# Patient Record
Sex: FEMALE | Race: WHITE | Employment: UNEMPLOYED | URBAN - METROPOLITAN AREA
[De-identification: names, ages, dates, MRNs, and addresses within clinical notes are randomized per-mention and may not be internally consistent; named-entity substitution may affect disease eponyms.]

---

## 2020-07-15 PROBLEM — M48.062 LUMBAR STENOSIS WITH NEUROGENIC CLAUDICATION: Status: ACTIVE | Noted: 2020-07-15

## 2022-10-11 PROBLEM — S32.009K LUMBAR PSEUDOARTHROSIS: Status: ACTIVE | Noted: 2022-10-11

## 2023-08-25 LAB
6-ACETYLMORPHINE: <25 NG/ML
7-AMINOCLONAZEPAM: <25 NG/ML
ALPHA-HYDROXYALPRAZOLAM: <25 NG/ML
ALPHA-HYDROXYMIDAZOLAM: <25 NG/ML
ALPRAZOLAM: <25 NG/ML
AMPHETAMINE (PRESENCE) IN URINE BY SCREEN METHOD: ABNORMAL
BARBITURATES PRESENCE IN URINE BY SCREEN METHOD: ABNORMAL
CANNABINOIDS IN URINE BY SCREEN METHOD: ABNORMAL
CHLORDIAZEPOXIDE: <25 NG/ML
CLONAZEPAM: <25 NG/ML
COCAINE (PRESENCE) IN URINE BY SCREEN METHOD: ABNORMAL
CODEINE: <50 NG/ML
CREATINE, URINE FOR DRUG: 11.8 MG/DL
DIAZEPAM: <25 NG/ML
DRUG SCREEN COMMENT URINE: ABNORMAL
EDDP: <25 NG/ML
FENTANYL CONFIRMATION, URINE: <2.5 NG/ML
HYDROCODONE: <25 NG/ML
HYDROMORPHONE: <25 NG/ML
LORAZEPAM: <25 NG/ML
METHADONE CONFIRMATION,URINE: <25 NG/ML
MIDAZOLAM: <25 NG/ML
MORPHINE URINE: <50 NG/ML
NORDIAZEPAM: <25 NG/ML
NORFENTANYL: <2.5 NG/ML
NORHYDROCODONE: <25 NG/ML
NOROXYCODONE: 657 NG/ML
O-DESMETHYLTRAMADOL: <50 NG/ML
OXAZEPAM: 70 NG/ML
OXYCODONE: 748 NG/ML
OXYMORPHONE: 508 NG/ML
PHENCYCLIDINE (PRESENCE) IN URINE BY SCREEN METHOD: ABNORMAL
SPECIFIC GRAVITY, URINE DRUG: 1
TEMAZEPAM: 34 NG/ML
TRAMADOL: <50 NG/ML
ZOLPIDEM METABOLITE (ZCA): 334 NG/ML
ZOLPIDEM: <25 NG/ML

## 2023-10-09 ENCOUNTER — TELEPHONE (OUTPATIENT)
Dept: PRIMARY CARE | Facility: CLINIC | Age: 63
End: 2023-10-09
Payer: COMMERCIAL

## 2023-10-09 DIAGNOSIS — M79.7 FIBROMYALGIA: Primary | ICD-10-CM

## 2023-10-09 PROBLEM — E78.2 MIXED HYPERLIPIDEMIA: Status: ACTIVE | Noted: 2023-10-09

## 2023-10-09 PROBLEM — M86.8X7 OSTEOMYELITIS OF FOREFOOT (MULTI): Status: ACTIVE | Noted: 2023-10-09

## 2023-10-09 PROBLEM — F41.8 SITUATIONAL ANXIETY: Status: ACTIVE | Noted: 2023-10-09

## 2023-10-09 PROBLEM — L40.0 PLAQUE PSORIASIS: Status: ACTIVE | Noted: 2023-10-09

## 2023-10-09 PROBLEM — J68.3 REACTIVE AIRWAYS DYSFUNCTION SYNDROME (MULTI): Status: ACTIVE | Noted: 2023-10-09

## 2023-10-09 PROBLEM — M19.041 LOCALIZED OSTEOARTHRITIS OF BOTH HANDS: Status: ACTIVE | Noted: 2023-10-09

## 2023-10-09 PROBLEM — M54.16 LUMBAR RADICULOPATHY: Status: ACTIVE | Noted: 2023-10-09

## 2023-10-09 PROBLEM — M19.042 LOCALIZED OSTEOARTHRITIS OF BOTH HANDS: Status: ACTIVE | Noted: 2023-10-09

## 2023-10-09 PROBLEM — M17.0 PRIMARY OSTEOARTHRITIS OF BOTH KNEES: Status: ACTIVE | Noted: 2023-10-09

## 2023-10-09 PROBLEM — K22.70 BARRETT'S ESOPHAGUS: Status: ACTIVE | Noted: 2023-10-09

## 2023-10-09 PROBLEM — I10 HYPERTENSION: Status: ACTIVE | Noted: 2023-10-09

## 2023-10-09 PROBLEM — M15.9 GENERALIZED OSTEOARTHRITIS: Status: ACTIVE | Noted: 2023-10-09

## 2023-10-09 PROBLEM — Z79.61 LONG-TERM CURRENT USE OF APREMILAST: Status: ACTIVE | Noted: 2023-10-09

## 2023-10-09 RX ORDER — LIFITEGRAST 50 MG/ML
SOLUTION/ DROPS OPHTHALMIC
COMMUNITY
Start: 2021-06-08 | End: 2023-11-27 | Stop reason: ALTCHOICE

## 2023-10-09 RX ORDER — PREGABALIN 150 MG/1
150 CAPSULE ORAL 2 TIMES DAILY
Qty: 60 CAPSULE | Refills: 0 | Status: CANCELLED | OUTPATIENT
Start: 2023-10-09

## 2023-10-09 RX ORDER — CELECOXIB 200 MG/1
200 CAPSULE ORAL DAILY
COMMUNITY
End: 2024-04-10 | Stop reason: SDUPTHER

## 2023-10-09 RX ORDER — APREMILAST 30 MG/1
1 TABLET, FILM COATED ORAL 2 TIMES DAILY
COMMUNITY
Start: 2016-10-05 | End: 2023-11-27 | Stop reason: ALTCHOICE

## 2023-10-09 RX ORDER — FLUTICASONE PROPIONATE 50 MCG
SPRAY, SUSPENSION (ML) NASAL
COMMUNITY
Start: 2023-05-18 | End: 2023-11-27 | Stop reason: ALTCHOICE

## 2023-10-09 RX ORDER — SUCRALFATE 1 G/1
TABLET ORAL
COMMUNITY
Start: 2021-02-15 | End: 2023-11-27 | Stop reason: ALTCHOICE

## 2023-10-09 RX ORDER — MONTELUKAST SODIUM 10 MG/1
10 TABLET ORAL NIGHTLY
COMMUNITY

## 2023-10-09 RX ORDER — PREGABALIN 150 MG/1
150 CAPSULE ORAL 2 TIMES DAILY
COMMUNITY
End: 2023-10-09 | Stop reason: SDUPTHER

## 2023-10-09 RX ORDER — IPRATROPIUM BROMIDE AND ALBUTEROL SULFATE 2.5; .5 MG/3ML; MG/3ML
SOLUTION RESPIRATORY (INHALATION)
COMMUNITY
Start: 2023-06-26

## 2023-10-09 RX ORDER — NALOXONE HYDROCHLORIDE 4 MG/.1ML
SPRAY NASAL
COMMUNITY
Start: 2019-07-29

## 2023-10-09 RX ORDER — ROSUVASTATIN CALCIUM 10 MG/1
10 TABLET, COATED ORAL DAILY
COMMUNITY
End: 2024-05-24 | Stop reason: WASHOUT

## 2023-10-09 RX ORDER — ALBUTEROL SULFATE 90 UG/1
AEROSOL, METERED RESPIRATORY (INHALATION)
COMMUNITY
Start: 2018-04-18

## 2023-10-09 RX ORDER — METOPROLOL SUCCINATE 50 MG/1
100 TABLET, EXTENDED RELEASE ORAL DAILY
COMMUNITY

## 2023-10-09 RX ORDER — PREGABALIN 150 MG/1
150 CAPSULE ORAL 2 TIMES DAILY
Qty: 60 CAPSULE | Refills: 0 | Status: SHIPPED | OUTPATIENT
Start: 2023-10-09 | End: 2023-11-13 | Stop reason: SDUPTHER

## 2023-10-09 RX ORDER — FUROSEMIDE 40 MG/1
1 TABLET ORAL DAILY PRN
COMMUNITY
Start: 2012-07-18

## 2023-10-09 RX ORDER — FLUTICASONE FUROATE AND VILANTEROL TRIFENATATE 200; 25 UG/1; UG/1
1 POWDER RESPIRATORY (INHALATION) DAILY
COMMUNITY

## 2023-10-09 RX ORDER — PREDNISONE 10 MG/1
10 TABLET ORAL DAILY
COMMUNITY

## 2023-10-09 RX ORDER — CARISOPRODOL 350 MG/1
TABLET ORAL
COMMUNITY
Start: 2022-10-17 | End: 2023-11-27 | Stop reason: ALTCHOICE

## 2023-10-09 RX ORDER — CHOLECALCIFEROL (VITAMIN D3) 25 MCG
1 TABLET ORAL DAILY
COMMUNITY
Start: 2017-06-29

## 2023-10-09 RX ORDER — CYCLOBENZAPRINE HCL 10 MG
10 TABLET ORAL 3 TIMES DAILY
COMMUNITY
Start: 2023-03-22 | End: 2024-05-24 | Stop reason: WASHOUT

## 2023-10-09 RX ORDER — OXYCODONE AND ACETAMINOPHEN 10; 325 MG/1; MG/1
TABLET ORAL
COMMUNITY
End: 2023-10-16 | Stop reason: SDUPTHER

## 2023-10-09 RX ORDER — ZOLPIDEM TARTRATE 10 MG/1
10 TABLET ORAL NIGHTLY
COMMUNITY

## 2023-10-09 RX ORDER — ALENDRONATE SODIUM 70 MG/1
TABLET ORAL
COMMUNITY
Start: 2023-04-13 | End: 2023-11-27 | Stop reason: ALTCHOICE

## 2023-10-09 RX ORDER — ESOMEPRAZOLE MAGNESIUM 40 MG/1
40 CAPSULE, DELAYED RELEASE ORAL DAILY
COMMUNITY

## 2023-10-09 RX ORDER — VENLAFAXINE HYDROCHLORIDE 75 MG/1
1 TABLET, EXTENDED RELEASE ORAL 2 TIMES DAILY
COMMUNITY
End: 2023-11-27 | Stop reason: ALTCHOICE

## 2023-10-09 RX ORDER — DIAZEPAM 5 MG/1
TABLET ORAL
COMMUNITY

## 2023-10-09 NOTE — TELEPHONE ENCOUNTER
Wanted to let you know that she needs another toe amuptated. For the back she has to go back on the Soma 350mg. Also needs a refill on Lyrica refilled. Miguel.

## 2023-10-09 NOTE — PROGRESS NOTES
Subjective   Patient ID: Pushpa Garrett is a 63 y.o. female who presents for No chief complaint on file..  HPI    Review of Systems    Objective   Physical Exam    Assessment/Plan

## 2023-10-16 ENCOUNTER — TELEPHONE (OUTPATIENT)
Dept: RHEUMATOLOGY | Facility: CLINIC | Age: 63
End: 2023-10-16
Payer: COMMERCIAL

## 2023-10-16 RX ORDER — OXYCODONE AND ACETAMINOPHEN 10; 325 MG/1; MG/1
1-2 TABLET ORAL EVERY 6 HOURS PRN
Qty: 150 TABLET | Refills: 0 | Status: SHIPPED | OUTPATIENT
Start: 2023-10-17 | End: 2023-11-13 | Stop reason: SDUPTHER

## 2023-10-16 NOTE — TELEPHONE ENCOUNTER
PT CALLED FOR REFILL:  Oxycodone  mg 1 every 4-6 hours as needed  Jose A        *getting amputation today - reason for asking day early.

## 2023-11-13 ENCOUNTER — TELEPHONE (OUTPATIENT)
Dept: RHEUMATOLOGY | Facility: CLINIC | Age: 63
End: 2023-11-13
Payer: COMMERCIAL

## 2023-11-13 DIAGNOSIS — M79.7 FIBROMYALGIA: ICD-10-CM

## 2023-11-13 RX ORDER — PREGABALIN 150 MG/1
150 CAPSULE ORAL 2 TIMES DAILY
Qty: 60 CAPSULE | Refills: 0 | Status: SHIPPED | OUTPATIENT
Start: 2023-11-15 | End: 2023-12-13 | Stop reason: SDUPTHER

## 2023-11-13 RX ORDER — OXYCODONE AND ACETAMINOPHEN 10; 325 MG/1; MG/1
1-2 TABLET ORAL EVERY 6 HOURS PRN
Qty: 150 TABLET | Refills: 0 | Status: SHIPPED | OUTPATIENT
Start: 2023-11-16 | End: 2023-12-13 | Stop reason: SDUPTHER

## 2023-11-13 NOTE — TELEPHONE ENCOUNTER
Patient called requesting rx refills-  Oxycodone  mg  Lyrica 150 mg bid    [facility] [phone]  Patient phone [phone]

## 2023-11-24 PROBLEM — Z79.899 MEDICATION MANAGEMENT: Status: ACTIVE | Noted: 2023-11-24

## 2023-11-24 PROBLEM — I70.0 CALCIFICATION OF AORTA (CMS-HCC): Status: ACTIVE | Noted: 2023-11-24

## 2023-11-24 NOTE — PROGRESS NOTES
Subjective   Patient ID: Pushpa Garrett is a 63 y.o. female who presents for Fibromyalgia.  Pt reports arthritis has been slowly worsening.  Has trouble doing anything around her home.   Pt is thinking of entering into hospice to see if that will help her with comfort and quality of life  Struggling to do basic things.  Knows she needs back surgery but doesn't have the will to pursue it   She is getting counseling to help her deal with her chronic issues    Arthritis  Presents for follow-up visit. She complains of pain and stiffness. She reports no joint swelling. The symptoms have been worsening. Pertinent negatives include no fever or rash. Compliance with medications is %.     OARRS:    I have personally reviewed the OARRS report for Pushpa Garrett. I have considered the risks of abuse, dependence, addiction and diversion and I believe that it is clinically appropriate for Pushpa Garrett to be prescribed this medication    Is the patient prescribed a combination of a benzodiazepine and opioid?  No    Last Urine Drug Screen / ordered today: Yes  Recent Results (from the past 8760 hour(s))   OPIATE/OPIOID/BENZO PRESCRIPTION COMPLIANCE    Collection Time: 08/21/23  9:56 AM   Result Value Ref Range    DRUG SCREEN COMMENT URINE SEE BELOW     Creatine, Urine 11.8 (A) mg/dL    Specific Gravity, Urine Drug 1.0040 Valid 1.0020-1.0200    Amphetamine Screen, Urine PRESUMPTIVE NEGATIVE NEGATIVE    Barbiturate Screen, Urine PRESUMPTIVE NEGATIVE NEGATIVE    Cannabinoid Screen, Urine PRESUMPTIVE NEGATIVE NEGATIVE    Cocaine Screen, Urine PRESUMPTIVE NEGATIVE NEGATIVE    PCP Screen, Urine PRESUMPTIVE NEGATIVE NEGATIVE    7-Aminoclonazepam <25 Cutoff <25 ng/mL    Alpha-Hydroxyalprazolam <25 Cutoff <25 ng/mL    Alpha-Hydroxymidazolam <25 Cutoff <25 ng/mL    Alprazolam <25 Cutoff <25 ng/mL    Chlordiazepoxide <25 Cutoff <25 ng/mL    Clonazepam <25 Cutoff <25 ng/mL    Diazepam <25 Cutoff <25 ng/mL    Lorazepam <25 Cutoff  <25 ng/mL    Midazolam <25 Cutoff <25 ng/mL    Nordiazepam <25 Cutoff <25 ng/mL    Oxazepam 70 (A) Cutoff <25 ng/mL    Temazepam 34 (A) Cutoff <25 ng/mL    Zolpidem <25 Cutoff <25 ng/mL    Zolpidem Metabolite (ZCA) 334 (A) Cutoff <25 ng/mL    6-Acetylmorphine <25 Cutoff <25 ng/mL    Codeine <50 Cutoff <50 ng/mL    Hydrocodone <25 Cutoff <25 ng/mL    Hydromorphone <25 Cutoff <25 ng/mL    Morphine Urine <50 Cutoff <50 ng/mL    Norhydrocodone <25 Cutoff <25 ng/mL    Noroxycodone 657 (A) Cutoff <25 ng/mL    Oxycodone 748 (A) Cutoff <25 ng/mL    Oxymorphone 508 (A) Cutoff <25 ng/mL    Tramadol <50 Cutoff <50 ng/mL    O-Desmethyltramadol <50 Cutoff <50 ng/mL    Fentanyl <2.5 Cutoff<2.5 ng/mL    Norfentanyl <2.5 Cutoff<2.5 ng/mL    METHADONE CONFIRMATION,URINE <25 Cutoff <25 ng/mL    EDDP <25 Cutoff <25 ng/mL     Results are as expected.         Controlled Substance Agreement:  Date of the Last Agreement: 11/27/23  Reviewed Controlled Substance Agreement including but not limited to the benefits, risks, and alternatives to treatment with a Controlled Substance medication(s).    Opioids:  What is the patient's goal of therapy? Pain relief  Is this being achieved with current treatment? yes    I have calculated the patient's Morphine Dose Equivalent (MED):   I have considered referral to Pain Management and/or a specialist, and do not feel it is necessary at this time.    I feel that it is clinically indicated to continue this current medication regimen after consideration of alternative therapies, and other non-opioid treatment.    Opioid Risk Screening:  depression    Pain Assessment:  Controlled substance questionnaire    On scale of 0-10  -pain on average over the last week? 9  -pain at its worst over the last week? 10   %pain relieved by meds? 80  Amount of pain relief with meds make a difference? y  MD agrees with efficacy of med? y    Better/same/worse  Physical functioning same  Family relationships same  Social  relationships same  Mood same  Sleep pattern same  Overall functioning same  Side effects? no  Patient Active Problem List    Diagnosis Date Noted    Type 2 diabetes mellitus without complication, without long-term current use of insulin (CMS/HCC) 11/27/2023    Chronic obstructive pulmonary disease, unspecified COPD type (CMS/HCC) 11/27/2023    Immunosuppression (CMS/HCC) 11/27/2023    Calcification of aorta (CMS/HCC) 11/24/2023    Medication management 11/24/2023    Aldana's esophagus 10/09/2023    Fibromyalgia 10/09/2023    Generalized osteoarthritis 10/09/2023    Hypertension 10/09/2023    Localized osteoarthritis of both hands 10/09/2023    Long-term current use of apremilast 10/09/2023    Lumbar radiculopathy 10/09/2023    Mixed hyperlipidemia 10/09/2023    Osteomyelitis of forefoot (CMS/HCC) 10/09/2023    Plaque psoriasis 10/09/2023    Primary osteoarthritis of both knees 10/09/2023    Reactive airways dysfunction syndrome (CMS/HCC) 10/09/2023    Situational anxiety 10/09/2023     Current Outpatient Medications   Medication Instructions    albuterol (ProAir HFA) 90 mcg/actuation inhaler inhalation    Breo Ellipta 200-25 mcg/dose inhaler 1 puff, inhalation, Daily    celecoxib (CELEBREX) 200 mg, oral, Daily    cholecalciferol (Vitamin D-3) 25 MCG (1000 UT) tablet 1 tablet, oral, Daily    cyclobenzaprine (FLEXERIL) 10 mg, oral, 3 times daily    diazePAM (Valium) 5 mg tablet TAKE 1 TABLET BY MOUTH EVERY 24 HOURS AS NEEDED    esomeprazole (NEXIUM) 40 mg, oral, Daily    furosemide (Lasix) 40 mg tablet 1 tablet, oral, Daily PRN    ipratropium-albuteroL (Duo-Neb) 0.5-2.5 mg/3 mL nebulizer solution USE 3 MLS VIA NEBULIZER EVERY 4 HOURS AS NEEDED    metoprolol succinate XL (TOPROL-XL) 50 mg, oral, Daily    montelukast (SINGULAIR) 10 mg, oral, Nightly    naloxone (Narcan) 4 mg/0.1 mL nasal spray nasal    nystatin (Mycostatin) 100,000 unit/mL suspension oral, 2 times daily    oxyCODONE-acetaminophen (Percocet)  mg  "tablet 1-2 tablets, oral, Every 6 hours PRN    predniSONE (DELTASONE) 10 mg, oral, Daily, as directed    pregabalin (LYRICA) 150 mg, oral, 2 times daily    promethazine (PHENERGAN) 25 mg, oral, 3 times daily before meals    rosuvastatin (CRESTOR) 10 mg, oral, Daily    tiZANidine (ZANAFLEX) 4 mg, oral, Every 8 hours PRN    zolpidem (AMBIEN) 10 mg, oral, Nightly     Allergies   Allergen Reactions    Adhesive Tape-Silicones Hives    Alendronate Other     Muscle/joint pain     Muscle/joint pain    Citalopram Other     throat closing      Morphine Other    Penicillins Other    Sulfamethoxazole-Trimethoprim Other    Adhesive Rash    Levofloxacin Other, GI Upset and Hives     Other reaction(s): GI Upset   Other reaction(s): GI Upset, HIVES   \"throat closed\"    Other reaction(s): GI Upset, HIVES    \"throat closed\"    Sulfa (Sulfonamide Antibiotics) Rash       Review of Systems   Constitutional:  Negative for fever and unexpected weight change.   HENT:  Negative for congestion.    Respiratory:  Negative for cough and shortness of breath.    Cardiovascular:  Negative for leg swelling.   Musculoskeletal:  Positive for arthralgias, arthritis, back pain, gait problem, myalgias and stiffness. Negative for joint swelling.   Skin:  Negative for color change and rash.   Neurological:  Positive for weakness. Negative for dizziness, numbness and headaches.   Hematological:  Does not bruise/bleed easily.   Psychiatric/Behavioral:  Positive for dysphoric mood.        Objective  BP (!) 172/94   Pulse 91   Temp 36.2 °C (97.1 °F)   Ht 1.702 m (5' 7\")   Wt 82.8 kg (182 lb 8 oz)   BMI 28.58 kg/m²     Physical Exam  Vitals reviewed.   Constitutional:       General: She is not in acute distress.     Appearance: Normal appearance.   HENT:      Head: Normocephalic and atraumatic.   Eyes:      Conjunctiva/sclera: Conjunctivae normal.   Pulmonary:      Effort: Pulmonary effort is normal. No respiratory distress.   Musculoskeletal:         " General: No swelling, tenderness or deformity.      Right lower leg: No edema.      Left lower leg: No edema.      Comments: +tender points.     Using walker to ambulate   Skin:     Findings: No erythema or rash.   Neurological:      General: No focal deficit present.      Mental Status: She is alert and oriented to person, place, and time. Mental status is at baseline.      Gait: Gait abnormal.   Psychiatric:         Mood and Affect: Mood normal.         Assessment/Plan   Problem List Items Addressed This Visit             ICD-10-CM    Fibromyalgia M79.7    Generalized osteoarthritis M15.9     OA and FMS on chronic opioids.  Still struggling to do things.   Pt hopes hospice will provide some stability for her         Reactive airways dysfunction syndrome (CMS/[facility]) J68.3     Stable  Managed by PCP         Medication management Z79.899     On chronic opioid.   CSA done 11/27/23         Type 2 diabetes mellitus without complication, without long-term current use of insulin (CMS/[facility]) - Primary E11.9     Stable managed by PCP         Chronic obstructive pulmonary disease, unspecified COPD type (CMS/[facility]) J44.9     Stable   Managed by PCP         Immunosuppression (CMS/[facility]) D84.9     On chronic steroids

## 2023-11-27 ENCOUNTER — OFFICE VISIT (OUTPATIENT)
Dept: RHEUMATOLOGY | Facility: CLINIC | Age: 63
End: 2023-11-27
Payer: COMMERCIAL

## 2023-11-27 VITALS
DIASTOLIC BLOOD PRESSURE: 94 MMHG | TEMPERATURE: 97.1 F | SYSTOLIC BLOOD PRESSURE: 172 MMHG | WEIGHT: 182.5 LBS | BODY MASS INDEX: 28.64 KG/M2 | HEIGHT: 67 IN | HEART RATE: 91 BPM

## 2023-11-27 DIAGNOSIS — J44.9 CHRONIC OBSTRUCTIVE PULMONARY DISEASE, UNSPECIFIED COPD TYPE (MULTI): ICD-10-CM

## 2023-11-27 DIAGNOSIS — E11.9 TYPE 2 DIABETES MELLITUS WITHOUT COMPLICATION, WITHOUT LONG-TERM CURRENT USE OF INSULIN (MULTI): ICD-10-CM

## 2023-11-27 DIAGNOSIS — J68.3 REACTIVE AIRWAYS DYSFUNCTION SYNDROME (MULTI): ICD-10-CM

## 2023-11-27 DIAGNOSIS — D84.9 IMMUNOSUPPRESSION (MULTI): ICD-10-CM

## 2023-11-27 DIAGNOSIS — Z79.899 MEDICATION MANAGEMENT: ICD-10-CM

## 2023-11-27 DIAGNOSIS — M79.7 FIBROMYALGIA: ICD-10-CM

## 2023-11-27 DIAGNOSIS — M15.9 GENERALIZED OSTEOARTHRITIS: Primary | ICD-10-CM

## 2023-11-27 PROCEDURE — 1160F RVW MEDS BY RX/DR IN RCRD: CPT | Performed by: INTERNAL MEDICINE

## 2023-11-27 PROCEDURE — 3077F SYST BP >= 140 MM HG: CPT | Performed by: INTERNAL MEDICINE

## 2023-11-27 PROCEDURE — 3080F DIAST BP >= 90 MM HG: CPT | Performed by: INTERNAL MEDICINE

## 2023-11-27 PROCEDURE — 99214 OFFICE O/P EST MOD 30 MIN: CPT | Performed by: INTERNAL MEDICINE

## 2023-11-27 PROCEDURE — 1125F AMNT PAIN NOTED PAIN PRSNT: CPT | Performed by: INTERNAL MEDICINE

## 2023-11-27 PROCEDURE — 1159F MED LIST DOCD IN RCRD: CPT | Performed by: INTERNAL MEDICINE

## 2023-11-27 PROCEDURE — 4004F PT TOBACCO SCREEN RCVD TLK: CPT | Performed by: INTERNAL MEDICINE

## 2023-11-27 RX ORDER — NYSTATIN 100000 [USP'U]/ML
SUSPENSION ORAL 2 TIMES DAILY
COMMUNITY
Start: 2023-11-24 | End: 2024-05-24 | Stop reason: WASHOUT

## 2023-11-27 RX ORDER — PROMETHAZINE HYDROCHLORIDE 25 MG/1
25 TABLET ORAL
COMMUNITY
Start: 2023-11-16

## 2023-11-27 RX ORDER — TIZANIDINE 4 MG/1
4 TABLET ORAL EVERY 8 HOURS PRN
COMMUNITY
Start: 2023-05-05 | End: 2024-02-19 | Stop reason: WASHOUT

## 2023-11-27 ASSESSMENT — ENCOUNTER SYMPTOMS
WEAKNESS: 1
BACK PAIN: 1
MYALGIAS: 1
COLOR CHANGE: 0
ARTHRALGIAS: 1
DIZZINESS: 0
SHORTNESS OF BREATH: 0
FEVER: 0
DYSPHORIC MOOD: 1
COUGH: 0
JOINT SWELLING: 0
HEADACHES: 0
BRUISES/BLEEDS EASILY: 0
NUMBNESS: 0
UNEXPECTED WEIGHT CHANGE: 0
STIFFNESS: 1

## 2023-11-27 ASSESSMENT — PATIENT HEALTH QUESTIONNAIRE - PHQ9
SUM OF ALL RESPONSES TO PHQ9 QUESTIONS 1 AND 2: 0
2. FEELING DOWN, DEPRESSED OR HOPELESS: NOT AT ALL
1. LITTLE INTEREST OR PLEASURE IN DOING THINGS: NOT AT ALL

## 2023-11-27 NOTE — ASSESSMENT & PLAN NOTE
OA and FMS on chronic opioids.  Still struggling to do things.   Pt hopes hospice will provide some stability for her

## 2023-11-27 NOTE — PATIENT INSTRUCTIONS
It was a pleasure to see you today  Please call if your symptoms worsen  Please review your summary for education and reminders.  Follow up at your next appointment.    If you had labs/xrays done today, you will be able to view on [employer]   We will contact you when the results are reviewed for further discussion.  Please note that you may receive your results before I have had a chance to review.  Please know I will be contacting you for discussion  Homegoing instructions for all patient  A healthy lifestyle helps chronic diseases  These are all the goals you should strive to improve your overall health   Blood pressure <130/85   BMI of <30 or waist circumference that is 1/2 of your height   Fasting blood sugar <107 (if you are diabetic, aim for an A1c <6.4%_   LDL cholesterol <130   Avoid smoking   Manage your stress   Get your preventive exams   Get your immunizations

## 2023-11-27 NOTE — LETTER
November 27, 2023     Ginette Avendano MD  [address] [address]    Patient: Pushpa Garrett   YOB: 1960   Date of Visit: 11/27/2023       Dear Dr. Ginette Avendano MD:    Thank you for referring Pushpa Garrett to me for evaluation. Below are my notes for this consultation.  If you have questions, please do not hesitate to call me. I look forward to following your patient along with you.       Sincerely,     Adilia Rincon MD      CC: No Recipients  ______________________________________________________________________________________    Subjective  Patient ID: Pushpa Garrett is a 63 y.o. female who presents for Fibromyalgia.  Pt reports arthritis has been slowly worsening.  Has trouble doing anything around her home.   Pt is thinking of entering into hospice to see if that will help her with comfort and quality of life  Struggling to do basic things.  Knows she needs back surgery but doesn't have the will to pursue it   She is getting counseling to help her deal with her chronic issues    Arthritis  Presents for follow-up visit. She complains of pain and stiffness. She reports no joint swelling. The symptoms have been worsening. Pertinent negatives include no fever or rash. Compliance with medications is %.     OARRS:    I have personally reviewed the OARRS report for Pushpa Garrett. I have considered the risks of abuse, dependence, addiction and diversion and I believe that it is clinically appropriate for Pushpa Garrett to be prescribed this medication    Is the patient prescribed a combination of a benzodiazepine and opioid?  No    Last Urine Drug Screen / ordered today: Yes  Recent Results (from the past 8760 hour(s))   OPIATE/OPIOID/BENZO PRESCRIPTION COMPLIANCE    Collection Time: 08/21/23  9:56 AM   Result Value Ref Range    DRUG SCREEN COMMENT URINE SEE BELOW     Creatine, Urine 11.8 (A) mg/dL    Specific Gravity, Urine Drug 1.0040 Valid 1.0020-1.0200     Amphetamine Screen, Urine PRESUMPTIVE NEGATIVE NEGATIVE    Barbiturate Screen, Urine PRESUMPTIVE NEGATIVE NEGATIVE    Cannabinoid Screen, Urine PRESUMPTIVE NEGATIVE NEGATIVE    Cocaine Screen, Urine PRESUMPTIVE NEGATIVE NEGATIVE    PCP Screen, Urine PRESUMPTIVE NEGATIVE NEGATIVE    7-Aminoclonazepam <25 Cutoff <25 ng/mL    Alpha-Hydroxyalprazolam <25 Cutoff <25 ng/mL    Alpha-Hydroxymidazolam <25 Cutoff <25 ng/mL    Alprazolam <25 Cutoff <25 ng/mL    Chlordiazepoxide <25 Cutoff <25 ng/mL    Clonazepam <25 Cutoff <25 ng/mL    Diazepam <25 Cutoff <25 ng/mL    Lorazepam <25 Cutoff <25 ng/mL    Midazolam <25 Cutoff <25 ng/mL    Nordiazepam <25 Cutoff <25 ng/mL    Oxazepam 70 (A) Cutoff <25 ng/mL    Temazepam 34 (A) Cutoff <25 ng/mL    Zolpidem <25 Cutoff <25 ng/mL    Zolpidem Metabolite (ZCA) 334 (A) Cutoff <25 ng/mL    6-Acetylmorphine <25 Cutoff <25 ng/mL    Codeine <50 Cutoff <50 ng/mL    Hydrocodone <25 Cutoff <25 ng/mL    Hydromorphone <25 Cutoff <25 ng/mL    Morphine Urine <50 Cutoff <50 ng/mL    Norhydrocodone <25 Cutoff <25 ng/mL    Noroxycodone 657 (A) Cutoff <25 ng/mL    Oxycodone 748 (A) Cutoff <25 ng/mL    Oxymorphone 508 (A) Cutoff <25 ng/mL    Tramadol <50 Cutoff <50 ng/mL    O-Desmethyltramadol <50 Cutoff <50 ng/mL    Fentanyl <2.5 Cutoff<2.5 ng/mL    Norfentanyl <2.5 Cutoff<2.5 ng/mL    METHADONE CONFIRMATION,URINE <25 Cutoff <25 ng/mL    EDDP <25 Cutoff <25 ng/mL     Results are as expected.         Controlled Substance Agreement:  Date of the Last Agreement: 11/27/23  Reviewed Controlled Substance Agreement including but not limited to the benefits, risks, and alternatives to treatment with a Controlled Substance medication(s).    Opioids:  What is the patient's goal of therapy? Pain relief  Is this being achieved with current treatment? yes    I have calculated the patient's Morphine Dose Equivalent (MED):   I have considered referral to Pain Management and/or a specialist, and do not feel it is  necessary at this time.    I feel that it is clinically indicated to continue this current medication regimen after consideration of alternative therapies, and other non-opioid treatment.    Opioid Risk Screening:  depression    Pain Assessment:  Controlled substance questionnaire    On scale of 0-10  -pain on average over the last week? 9  -pain at its worst over the last week? 10   %pain relieved by meds? 80  Amount of pain relief with meds make a difference? y  MD agrees with efficacy of med? y    Better/same/worse  Physical functioning same  Family relationships same  Social relationships same  Mood same  Sleep pattern same  Overall functioning same  Side effects? no  Patient Active Problem List    Diagnosis Date Noted   • Type 2 diabetes mellitus without complication, without long-term current use of insulin (CMS/HCC) 11/27/2023   • Chronic obstructive pulmonary disease, unspecified COPD type (CMS/HCC) 11/27/2023   • Immunosuppression (CMS/HCC) 11/27/2023   • Calcification of aorta (CMS/HCC) 11/24/2023   • Medication management 11/24/2023   • Aldana's esophagus 10/09/2023   • Fibromyalgia 10/09/2023   • Generalized osteoarthritis 10/09/2023   • Hypertension 10/09/2023   • Localized osteoarthritis of both hands 10/09/2023   • Long-term current use of apremilast 10/09/2023   • Lumbar radiculopathy 10/09/2023   • Mixed hyperlipidemia 10/09/2023   • Osteomyelitis of forefoot (CMS/HCC) 10/09/2023   • Plaque psoriasis 10/09/2023   • Primary osteoarthritis of both knees 10/09/2023   • Reactive airways dysfunction syndrome (CMS/HCC) 10/09/2023   • Situational anxiety 10/09/2023     Current Outpatient Medications   Medication Instructions   • albuterol (ProAir HFA) 90 mcg/actuation inhaler inhalation   • Breo Ellipta 200-25 mcg/dose inhaler 1 puff, inhalation, Daily   • celecoxib (CELEBREX) 200 mg, oral, Daily   • cholecalciferol (Vitamin D-3) 25 MCG (1000 UT) tablet 1 tablet, oral, Daily   • cyclobenzaprine (FLEXERIL)  "10 mg, oral, 3 times daily   • diazePAM (Valium) 5 mg tablet TAKE 1 TABLET BY MOUTH EVERY 24 HOURS AS NEEDED   • esomeprazole (NEXIUM) 40 mg, oral, Daily   • furosemide (Lasix) 40 mg tablet 1 tablet, oral, Daily PRN   • ipratropium-albuteroL (Duo-Neb) 0.5-2.5 mg/3 mL nebulizer solution USE 3 MLS VIA NEBULIZER EVERY 4 HOURS AS NEEDED   • metoprolol succinate XL (TOPROL-XL) 50 mg, oral, Daily   • montelukast (SINGULAIR) 10 mg, oral, Nightly   • naloxone (Narcan) 4 mg/0.1 mL nasal spray nasal   • nystatin (Mycostatin) 100,000 unit/mL suspension oral, 2 times daily   • oxyCODONE-acetaminophen (Percocet)  mg tablet 1-2 tablets, oral, Every 6 hours PRN   • predniSONE (DELTASONE) 10 mg, oral, Daily, as directed   • pregabalin (LYRICA) 150 mg, oral, 2 times daily   • promethazine (PHENERGAN) 25 mg, oral, 3 times daily before meals   • rosuvastatin (CRESTOR) 10 mg, oral, Daily   • tiZANidine (ZANAFLEX) 4 mg, oral, Every 8 hours PRN   • zolpidem (AMBIEN) 10 mg, oral, Nightly     Allergies   Allergen Reactions   • Adhesive Tape-Silicones Hives   • Alendronate Other     Muscle/joint pain     Muscle/joint pain   • Citalopram Other     throat closing     • Morphine Other   • Penicillins Other   • Sulfamethoxazole-Trimethoprim Other   • Adhesive Rash   • Levofloxacin Other, GI Upset and Hives     Other reaction(s): GI Upset   Other reaction(s): GI Upset, HIVES   \"throat closed\"    Other reaction(s): GI Upset, HIVES    \"throat closed\"   • Sulfa (Sulfonamide Antibiotics) Rash       Review of Systems   Constitutional:  Negative for fever and unexpected weight change.   HENT:  Negative for congestion.    Respiratory:  Negative for cough and shortness of breath.    Cardiovascular:  Negative for leg swelling.   Musculoskeletal:  Positive for arthralgias, arthritis, back pain, gait problem, myalgias and stiffness. Negative for joint swelling.   Skin:  Negative for color change and rash.   Neurological:  Positive for weakness. " "Negative for dizziness, numbness and headaches.   Hematological:  Does not bruise/bleed easily.   Psychiatric/Behavioral:  Positive for dysphoric mood.        Objective BP (!) 172/94   Pulse 91   Temp 36.2 °C (97.1 °F)   Ht 1.702 m (5' 7\")   Wt 82.8 kg (182 lb 8 oz)   BMI 28.58 kg/m²     Physical Exam  Vitals reviewed.   Constitutional:       General: She is not in acute distress.     Appearance: Normal appearance.   HENT:      Head: Normocephalic and atraumatic.   Eyes:      Conjunctiva/sclera: Conjunctivae normal.   Pulmonary:      Effort: Pulmonary effort is normal. No respiratory distress.   Musculoskeletal:         General: No swelling, tenderness or deformity.      Right lower leg: No edema.      Left lower leg: No edema.      Comments: +tender points.     Using walker to ambulate   Skin:     Findings: No erythema or rash.   Neurological:      General: No focal deficit present.      Mental Status: She is alert and oriented to person, place, and time. Mental status is at baseline.      Gait: Gait abnormal.   Psychiatric:         Mood and Affect: Mood normal.         Assessment/Plan  Problem List Items Addressed This Visit             ICD-10-CM    Fibromyalgia M79.7    Generalized osteoarthritis M15.9     OA and FMS on chronic opioids.  Still struggling to do things.   Pt hopes hospice will provide some stability for her         Reactive airways dysfunction syndrome (CMS/[facility]) J68.3     Stable  Managed by PCP         Medication management Z79.899     On chronic opioid.   CSA done 11/27/23         Type 2 diabetes mellitus without complication, without long-term current use of insulin (CMS/[facility]) - Primary E11.9     Stable managed by PCP         Chronic obstructive pulmonary disease, unspecified COPD type (CMS/[facility]) J44.9     Stable   Managed by PCP         Immunosuppression (CMS/[facility]) D84.9     On chronic steroids               "

## 2023-12-13 ENCOUNTER — TELEPHONE (OUTPATIENT)
Dept: RHEUMATOLOGY | Facility: CLINIC | Age: 63
End: 2023-12-13
Payer: COMMERCIAL

## 2023-12-13 DIAGNOSIS — M79.7 FIBROMYALGIA: ICD-10-CM

## 2023-12-13 RX ORDER — OXYCODONE AND ACETAMINOPHEN 10; 325 MG/1; MG/1
1-2 TABLET ORAL EVERY 6 HOURS PRN
Qty: 150 TABLET | Refills: 0 | Status: SHIPPED | OUTPATIENT
Start: 2023-12-16 | End: 2024-01-12 | Stop reason: SDUPTHER

## 2023-12-13 RX ORDER — PREGABALIN 150 MG/1
150 CAPSULE ORAL 2 TIMES DAILY
Qty: 60 CAPSULE | Refills: 0 | Status: SHIPPED | OUTPATIENT
Start: 2023-12-15 | End: 2024-01-12 | Stop reason: SDUPTHER

## 2023-12-13 NOTE — TELEPHONE ENCOUNTER
Patient is trying to get set up for hospice.  She hasn't had everything set up yet and wonders if it is ok with you if she gets the script of Soma that Dr. Lama.      [phone]

## 2024-01-04 ENCOUNTER — TELEPHONE (OUTPATIENT)
Dept: RHEUMATOLOGY | Facility: CLINIC | Age: 64
End: 2024-01-04
Payer: COMMERCIAL

## 2024-01-04 NOTE — TELEPHONE ENCOUNTER
Anamaria @ [facility]  Ph [phone]    Pt un-enrolled in hospice  She does not have a hospice dx code  She is now out of Percocet - hospice destroyed her medication when enrolled in hospice.    Anamaria asked if you would send her enough medication from today until 1/15/2024 her next date.    If you need to speak to hospice doctor .. Dr. Aren Soares would need to contact anamaria who will contact him for his private number.    Dr. Soares will only send in Percocet (55 pills) if you are not willing to send in medication until you pick her back up after 1/15/204.      [facility] # [phone]

## 2024-01-12 ENCOUNTER — TELEPHONE (OUTPATIENT)
Dept: RHEUMATOLOGY | Facility: CLINIC | Age: 64
End: 2024-01-12
Payer: COMMERCIAL

## 2024-01-12 DIAGNOSIS — M79.7 FIBROMYALGIA: ICD-10-CM

## 2024-01-12 RX ORDER — OXYCODONE AND ACETAMINOPHEN 10; 325 MG/1; MG/1
1-2 TABLET ORAL EVERY 6 HOURS PRN
Qty: 150 TABLET | Refills: 0 | Status: SHIPPED | OUTPATIENT
Start: 2024-01-15 | End: 2024-02-09 | Stop reason: SDUPTHER

## 2024-01-12 RX ORDER — PREGABALIN 150 MG/1
150 CAPSULE ORAL 2 TIMES DAILY
Qty: 60 CAPSULE | Refills: 0 | Status: SHIPPED | OUTPATIENT
Start: 2024-01-14 | End: 2024-02-09 | Stop reason: SDUPTHER

## 2024-01-12 NOTE — TELEPHONE ENCOUNTER
Refill done to listed pharmacy.  Oxycodone 1/15 fill date (did get extra from surgeon; I am aware)  Lyrica 1/14 fill date

## 2024-01-12 NOTE — TELEPHONE ENCOUNTER
Pt called for refill    Oxycodone  mg 1-2 tab every 6 hrs   Lyrica 150 mg bid  [facility] [phone]

## 2024-01-31 ENCOUNTER — TELEPHONE (OUTPATIENT)
Dept: RHEUMATOLOGY | Facility: CLINIC | Age: 64
End: 2024-01-31
Payer: COMMERCIAL

## 2024-01-31 DIAGNOSIS — M15.9 GENERALIZED OSTEOARTHRITIS: ICD-10-CM

## 2024-01-31 DIAGNOSIS — M79.7 FIBROMYALGIA: Primary | ICD-10-CM

## 2024-01-31 NOTE — TELEPHONE ENCOUNTER
Patient is currently in palative care.  They suggested she see someone in pain management.  They did not suggest anyone can you do a referral for her.  She is very nervous about doing this.

## 2024-02-01 ENCOUNTER — TELEPHONE (OUTPATIENT)
Dept: PRIMARY CARE | Facility: CLINIC | Age: 64
End: 2024-02-01
Payer: COMMERCIAL

## 2024-02-08 ENCOUNTER — OFFICE VISIT (OUTPATIENT)
Dept: PAIN MEDICINE | Facility: CLINIC | Age: 64
End: 2024-02-08
Payer: COMMERCIAL

## 2024-02-08 VITALS
RESPIRATION RATE: 18 BRPM | OXYGEN SATURATION: 94 % | BODY MASS INDEX: 28.56 KG/M2 | HEART RATE: 69 BPM | DIASTOLIC BLOOD PRESSURE: 81 MMHG | WEIGHT: 182 LBS | SYSTOLIC BLOOD PRESSURE: 159 MMHG | TEMPERATURE: 97.2 F | HEIGHT: 67 IN

## 2024-02-08 DIAGNOSIS — Z98.1 S/P LUMBAR FUSION: ICD-10-CM

## 2024-02-08 DIAGNOSIS — M79.7 FIBROMYALGIA: Primary | ICD-10-CM

## 2024-02-08 DIAGNOSIS — M54.2 NECK PAIN: ICD-10-CM

## 2024-02-08 PROCEDURE — 1160F RVW MEDS BY RX/DR IN RCRD: CPT | Performed by: ANESTHESIOLOGY

## 2024-02-08 PROCEDURE — 99204 OFFICE O/P NEW MOD 45 MIN: CPT | Performed by: ANESTHESIOLOGY

## 2024-02-08 PROCEDURE — 1125F AMNT PAIN NOTED PAIN PRSNT: CPT | Performed by: ANESTHESIOLOGY

## 2024-02-08 PROCEDURE — 99214 OFFICE O/P EST MOD 30 MIN: CPT | Performed by: ANESTHESIOLOGY

## 2024-02-08 PROCEDURE — 3077F SYST BP >= 140 MM HG: CPT | Performed by: ANESTHESIOLOGY

## 2024-02-08 PROCEDURE — 3079F DIAST BP 80-89 MM HG: CPT | Performed by: ANESTHESIOLOGY

## 2024-02-08 PROCEDURE — 1159F MED LIST DOCD IN RCRD: CPT | Performed by: ANESTHESIOLOGY

## 2024-02-08 SDOH — ECONOMIC STABILITY: FOOD INSECURITY: WITHIN THE PAST 12 MONTHS, YOU WORRIED THAT YOUR FOOD WOULD RUN OUT BEFORE YOU GOT MONEY TO BUY MORE.: NEVER TRUE

## 2024-02-08 SDOH — ECONOMIC STABILITY: FOOD INSECURITY: WITHIN THE PAST 12 MONTHS, THE FOOD YOU BOUGHT JUST DIDN'T LAST AND YOU DIDN'T HAVE MONEY TO GET MORE.: NEVER TRUE

## 2024-02-08 ASSESSMENT — LIFESTYLE VARIABLES
TOTAL SCORE: 0
HOW OFTEN DO YOU HAVE SIX OR MORE DRINKS ON ONE OCCASION: NEVER
AUDIT-C TOTAL SCORE: 0
HOW MANY STANDARD DRINKS CONTAINING ALCOHOL DO YOU HAVE ON A TYPICAL DAY: PATIENT DOES NOT DRINK
SKIP TO QUESTIONS 9-10: 1
HOW OFTEN DO YOU HAVE A DRINK CONTAINING ALCOHOL: NEVER

## 2024-02-08 ASSESSMENT — PAIN - FUNCTIONAL ASSESSMENT: PAIN_FUNCTIONAL_ASSESSMENT: 0-10

## 2024-02-08 ASSESSMENT — ENCOUNTER SYMPTOMS
LOSS OF SENSATION IN FEET: 1
DEPRESSION: 1
OCCASIONAL FEELINGS OF UNSTEADINESS: 1
NECK PAIN: 1
ADENOPATHY: 0
ABDOMINAL PAIN: 0
BACK PAIN: 1
DYSURIA: 0
SHORTNESS OF BREATH: 1
FEVER: 0
WEAKNESS: 0
EYE PAIN: 0

## 2024-02-08 ASSESSMENT — PAIN SCALES - GENERAL
PAINLEVEL: 10-WORST PAIN EVER
PAINLEVEL_OUTOF10: 10 - WORST POSSIBLE PAIN

## 2024-02-08 ASSESSMENT — PATIENT HEALTH QUESTIONNAIRE - PHQ9
1. LITTLE INTEREST OR PLEASURE IN DOING THINGS: NOT AT ALL
2. FEELING DOWN, DEPRESSED OR HOPELESS: NOT AT ALL
SUM OF ALL RESPONSES TO PHQ9 QUESTIONS 1 AND 2: 0

## 2024-02-08 ASSESSMENT — PAIN DESCRIPTION - DESCRIPTORS: DESCRIPTORS: ACHING;BURNING

## 2024-02-08 ASSESSMENT — COLUMBIA-SUICIDE SEVERITY RATING SCALE - C-SSRS
2. HAVE YOU ACTUALLY HAD ANY THOUGHTS OF KILLING YOURSELF?: NO
6. HAVE YOU EVER DONE ANYTHING, STARTED TO DO ANYTHING, OR PREPARED TO DO ANYTHING TO END YOUR LIFE?: NO
1. IN THE PAST MONTH, HAVE YOU WISHED YOU WERE DEAD OR WISHED YOU COULD GO TO SLEEP AND NOT WAKE UP?: NO

## 2024-02-08 NOTE — PROGRESS NOTES
Chief Complain    New Patient Visit (For pain from neck down to tailbone. Pain been going on several years. Had 7 surgeries and last one was in 2022, was done at [facility]. Had Xray done that says its broke. Was done at [facility]. Had 2 amputations left foot, Big toe and one next too it with worse pain. Was on hospice for a few weeks for COPD. Was discharge. Currently take oxycodone was prescribed by rheumatology. Would like to discuss other pain options.)    History Of Present Illness  Pushpa Garrett is a 63 y.o. female here for evaluation of pain in the neck all the down to the tailbone, right leg pain. The patient has been experiencing these symptoms for last 1.5 year(s). The patient describes the pain as sharp, dull, aching, tingling. The patient's current pain score is 10 on a scale from 0-10. The pain is worsened by bending forward, standing, and getting in and out of the car  and is alleviated by heat. Since the start of the symptoms the pain has been worse.    The patient denies any fever, chills, weight loss, weakness,  bladder/ bowel incontinence, history of cancer, history of IV drug abuse, recent trauma.    Home hospice 12/26/23 for 2 weeks.    S/p multiple spine surgery, L3-S1 fusion last surgery 2022 which made it worse.     S/p left big and second toe amputated - August of 2022      Past Medical History  She has a past medical history of Bile duct stenosis, Helicobacter pylori (H. pylori) infection, Herpes zoster (01/28/2019), Leukocytosis (07/29/2019), Other osteomyelitis, ankle and foot (CMS/HCC) (07/25/2022), Pleurisy (04/18/2018), and Sciatica, right side (12/13/2017).    Surgical History  She has a past surgical history that includes Spine surgery (01/20/2014); Lumbar spine surgery (10/24/2022); and Toe amputation (09/15/2022).    Social History  She reports that she has been smoking cigarettes. She has never used smokeless tobacco. She reports that she does not drink alcohol and does not use  drugs.    Family History  Family History   Problem Relation Name Age of Onset    Stroke Mother      Diabetes Mother      Heart disease Mother      Cancer Father      Heart disease Father      Arthritis Brother          Allergies  Adhesive tape-silicones, Alendronate, Citalopram, Morphine, Penicillins, Sulfamethoxazole-trimethoprim, Adhesive, Levofloxacin, and Sulfa (sulfonamide antibiotics)    Review of Systems  Review of Systems   Constitutional:  Negative for fever.   HENT:  Negative for ear pain.    Eyes:  Negative for pain.   Respiratory:  Positive for shortness of breath.    Cardiovascular:  Negative for chest pain.   Gastrointestinal:  Negative for abdominal pain.   Endocrine: Negative for cold intolerance and heat intolerance.   Genitourinary:  Negative for dysuria.   Musculoskeletal:  Positive for back pain and neck pain.   Skin:  Negative for rash.   Allergic/Immunologic: Negative for food allergies.   Neurological:  Negative for weakness.   Hematological:  Negative for adenopathy.   Psychiatric/Behavioral:  Negative for suicidal ideas.         Physical Exam  Physical Exam  Eyes:      Extraocular Movements: Extraocular movements intact.      Pupils: Pupils are equal, round, and reactive to light.   Cardiovascular:      Rate and Rhythm: Normal rate and regular rhythm.   Pulmonary:      Effort: Pulmonary effort is normal.   Abdominal:      Palpations: Abdomen is soft.   Skin:     General: Skin is warm.   Neurological:      Mental Status: She is alert and oriented to person, place, and time.      Deep Tendon Reflexes:      Reflex Scores:       Patellar reflexes are 2+ on the right side and 3+ on the left side.       Achilles reflexes are 2+ on the right side and 3+ on the left side.     Comments: 4-/5 strength in left ankle dorsi and plantar flexion   Psychiatric:         Behavior: Behavior normal.           Last Recorded Vitals  Blood pressure 159/81, pulse 69, temperature 36.2 °C (97.2 °F), resp. rate 18,  "height 1.702 m (5' 7\"), weight 82.6 kg (182 lb), SpO2 94 %.    Reviewed Images    Impression    1.  Revised posterior spinal fusion, the hardware construct now spanning L3-S1, and L3-4 interbody fusion.  2.  No significant spinal stenosis.  3.  No significant foraminal narrowing, though the L3-4 and L4-5 neural foramina are obscured by hardware artifact.     Reviewed Labs    Component  Ref Range & Units 1 mo ago   WBC  3.70 - 11.00 k/uL 19.27 High    RBC  3.90 - 5.20 m/uL 4.61   Hemoglobin  11.5 - 15.5 g/dL 15.7 High    Hematocrit  36.0 - 46.0 % 45.7   MCV  80.0 - 100.0 fL 99.1   MCH  26.0 - 34.0 pg 34.1 High    MCHC  30.5 - 36.0 g/dL 34.4   RDW-CV  11.5 - 15.0 % 13.6   Platelet Count  150 - 400 k/uL 221   MPV  9.0 - 12.7 fL 10.4   Neutrophils %  % 76.2   Abs Neut  1.45 - 7.50 k/uL 14.69 High    Lymphocytes %  % 14.0   Abs Lymph  1.00 - 4.00 k/uL 2.70   Monocytes %  % 8.8   Abs Mono  <0.87 k/uL 1.69 High    Eosinophils %  % 0.2   Abs Eosin  <0.46 k/uL 0.03   Basophils %  % 0.2   Abs Baso  <0.11 k/uL 0.04   Immature Granulocytes %  % 0.6   Abs Immature Gran  <0.10 k/uL 0.12 High    NRBC  /100 WBC 0.0   Absolute nRBC  <0.01 k/uL <0.01   Diff Type Auto       Assessment/Plan   Encounter Diagnoses   Name Primary?    Fibromyalgia Yes    S/P lumbar fusion     Neck pain         Pushpa Garrett is a 63 y.o. female here for evaluation of neck pain radiating down her spine to her tailbone area.  Also been experiencing bilateral leg pain.  She has past medically significant of COPD she is current active smoker smokes 2 packs a day.  She also has history of multiple back surgeries total of 7 last surgery was done in 2022, she currently have an L3-S1 fusion.  She has been managed by her primary care provider and by Dr. Francis who is her rheumatologist, has been diagnosed with generalized osteoarthritis, fibromyalgia, postlaminectomy syndrome.  She has been on chronic opiate therapy for years.   She is on 10 mg of oxycodone 5 " times daily, in addition she also takes Lyrica, Soma.  She is getting some modest benefit.  She was in home hospice from 26 December 2023 for couple of weeks where she was getting 30 mg of oxycodone every 3 hours.  However because of her lung function improved she is no longer in hospice.  She was getting much better improvement with those high doses of opioids.     I reviewed report of most recent MRI lumbar spine which reveals L3-S1 fusion however she did not have any significant spinal canal or neuroforaminal stenosis.  She does appear to have a chronic L2 compression fracture. Etiology of her pain  appears to be complex and multifactorial with central sensitization playing an important factor.  I do not recommend increasing the dose of opioids from what ever she is currently taking.  Opioid rotation may be an option which she can discuss with her current prescriber.      Discussed other options including trial of opiate sparing ketamine, lidocaine propofol infusions, adding Cymbalta, physical therapy, cognitive behavioral therapy, consideration of intrathecal therapy or spinal cord stimulation(would refer her to [facility]).  She did not express any interest in the options discussed.  She will get back to us if she changes her mind.    I spent 49 minutes in the professional and overall care of this patient.       Cholo Cueva MD

## 2024-02-09 ENCOUNTER — TELEPHONE (OUTPATIENT)
Dept: RHEUMATOLOGY | Facility: CLINIC | Age: 64
End: 2024-02-09
Payer: COMMERCIAL

## 2024-02-09 DIAGNOSIS — M79.7 FIBROMYALGIA: ICD-10-CM

## 2024-02-09 RX ORDER — OXYCODONE AND ACETAMINOPHEN 10; 325 MG/1; MG/1
1-2 TABLET ORAL EVERY 6 HOURS PRN
Qty: 150 TABLET | Refills: 0 | Status: SHIPPED | OUTPATIENT
Start: 2024-02-14 | End: 2024-03-11 | Stop reason: SDUPTHER

## 2024-02-09 RX ORDER — PREGABALIN 150 MG/1
150 CAPSULE ORAL 2 TIMES DAILY
Qty: 60 CAPSULE | Refills: 0 | Status: SHIPPED | OUTPATIENT
Start: 2024-02-13 | End: 2024-03-11 | Stop reason: SDUPTHER

## 2024-02-17 PROBLEM — T38.0X5A IMMUNOSUPPRESSION DUE TO CHRONIC STEROID USE (MULTI): Status: ACTIVE | Noted: 2023-11-27

## 2024-02-17 PROBLEM — D84.821 IMMUNOSUPPRESSION DUE TO CHRONIC STEROID USE (MULTI): Status: ACTIVE | Noted: 2023-11-27

## 2024-02-17 PROBLEM — Z79.52 IMMUNOSUPPRESSION DUE TO CHRONIC STEROID USE (MULTI): Status: ACTIVE | Noted: 2023-11-27

## 2024-02-19 ENCOUNTER — TELEMEDICINE (OUTPATIENT)
Dept: RHEUMATOLOGY | Facility: CLINIC | Age: 64
End: 2024-02-19
Payer: COMMERCIAL

## 2024-02-19 VITALS
DIASTOLIC BLOOD PRESSURE: 102 MMHG | BODY MASS INDEX: 26.84 KG/M2 | TEMPERATURE: 97.9 F | WEIGHT: 167 LBS | OXYGEN SATURATION: 95 % | HEART RATE: 84 BPM | HEIGHT: 66 IN | SYSTOLIC BLOOD PRESSURE: 156 MMHG

## 2024-02-19 DIAGNOSIS — Z79.899 MEDICATION MANAGEMENT: ICD-10-CM

## 2024-02-19 DIAGNOSIS — D84.821 IMMUNOSUPPRESSION DUE TO CHRONIC STEROID USE (MULTI): ICD-10-CM

## 2024-02-19 DIAGNOSIS — Z79.52 IMMUNOSUPPRESSION DUE TO CHRONIC STEROID USE (MULTI): ICD-10-CM

## 2024-02-19 DIAGNOSIS — M15.9 GENERALIZED OSTEOARTHRITIS: ICD-10-CM

## 2024-02-19 DIAGNOSIS — M79.7 FIBROMYALGIA: Primary | ICD-10-CM

## 2024-02-19 DIAGNOSIS — J68.3 REACTIVE AIRWAYS DYSFUNCTION SYNDROME (MULTI): ICD-10-CM

## 2024-02-19 DIAGNOSIS — E11.9 TYPE 2 DIABETES MELLITUS WITHOUT COMPLICATION, WITHOUT LONG-TERM CURRENT USE OF INSULIN (MULTI): ICD-10-CM

## 2024-02-19 DIAGNOSIS — T38.0X5A IMMUNOSUPPRESSION DUE TO CHRONIC STEROID USE (MULTI): ICD-10-CM

## 2024-02-19 PROCEDURE — 99213 OFFICE O/P EST LOW 20 MIN: CPT | Performed by: INTERNAL MEDICINE

## 2024-02-19 PROCEDURE — 1159F MED LIST DOCD IN RCRD: CPT | Performed by: INTERNAL MEDICINE

## 2024-02-19 PROCEDURE — 3077F SYST BP >= 140 MM HG: CPT | Performed by: INTERNAL MEDICINE

## 2024-02-19 PROCEDURE — 1160F RVW MEDS BY RX/DR IN RCRD: CPT | Performed by: INTERNAL MEDICINE

## 2024-02-19 PROCEDURE — 1125F AMNT PAIN NOTED PAIN PRSNT: CPT | Performed by: INTERNAL MEDICINE

## 2024-02-19 PROCEDURE — 3080F DIAST BP >= 90 MM HG: CPT | Performed by: INTERNAL MEDICINE

## 2024-02-19 RX ORDER — TIZANIDINE 4 MG/1
4 TABLET ORAL EVERY 8 HOURS PRN
Qty: 30 TABLET | Refills: 11 | COMMUNITY
Start: 2024-02-19 | End: 2024-03-22 | Stop reason: SDUPTHER

## 2024-02-19 RX ORDER — CARISOPRODOL 350 MG/1
350 TABLET ORAL 2 TIMES DAILY
COMMUNITY
End: 2024-02-19 | Stop reason: ALTCHOICE

## 2024-02-19 ASSESSMENT — PATIENT HEALTH QUESTIONNAIRE - PHQ9
10. IF YOU CHECKED OFF ANY PROBLEMS, HOW DIFFICULT HAVE THESE PROBLEMS MADE IT FOR YOU TO DO YOUR WORK, TAKE CARE OF THINGS AT HOME, OR GET ALONG WITH OTHER PEOPLE: EXTREMELY DIFFICULT
6. FEELING BAD ABOUT YOURSELF - OR THAT YOU ARE A FAILURE OR HAVE LET YOURSELF OR YOUR FAMILY DOWN: NOT AT ALL
7. TROUBLE CONCENTRATING ON THINGS, SUCH AS READING THE NEWSPAPER OR WATCHING TELEVISION: NEARLY EVERY DAY
SUM OF ALL RESPONSES TO PHQ9 QUESTIONS 1 & 2: 6
5. POOR APPETITE OR OVEREATING: NOT AT ALL
1. LITTLE INTEREST OR PLEASURE IN DOING THINGS: NEARLY EVERY DAY
4. FEELING TIRED OR HAVING LITTLE ENERGY: NEARLY EVERY DAY
2. FEELING DOWN, DEPRESSED OR HOPELESS: NEARLY EVERY DAY
9. THOUGHTS THAT YOU WOULD BE BETTER OFF DEAD, OR OF HURTING YOURSELF: NOT AT ALL
8. MOVING OR SPEAKING SO SLOWLY THAT OTHER PEOPLE COULD HAVE NOTICED. OR THE OPPOSITE, BEING SO FIGETY OR RESTLESS THAT YOU HAVE BEEN MOVING AROUND A LOT MORE THAN USUAL: NEARLY EVERY DAY
3. TROUBLE FALLING OR STAYING ASLEEP: NEARLY EVERY DAY
SUM OF ALL RESPONSES TO PHQ QUESTIONS 1-9: 18

## 2024-02-19 ASSESSMENT — ENCOUNTER SYMPTOMS
NECK PAIN: 1
FATIGUE: 1
NUMBNESS: 1
JOINT SWELLING: 0
WEAKNESS: 1
COUGH: 0
SHORTNESS OF BREATH: 0
BACK PAIN: 1
MYALGIAS: 1
COLOR CHANGE: 0
FEVER: 0
ARTHRALGIAS: 1

## 2024-02-19 NOTE — ASSESSMENT & PLAN NOTE
FMS and OA.  Requires opioids and muscle relaxers.   Advised to stop soma and return to tizanidine.  Pt on low dose daily steroids --continue to monitor

## 2024-02-19 NOTE — PROGRESS NOTES
Virtual or Telephone Consent    An interactive audio and video telecommunication system which permits real time communications between the patient (at the originating site) and provider (at the distant site) was utilized to provide this telehealth service.   Verbal consent was requested and obtained from Pushpa Garrett on this date, 02/19/24 for a telehealth visit.     RHEUMATOLOGY PROGRESS NOTE  Pushpa Garrett 63 y.o. female  Chief Complaint   Patient presents with    Follow-up    Fibromyalgia       SUBJECTIVE  Re:  FMS and OA  Pt continues to have a lot of pain.  Is getting testing done to determine the cause of a squeezing torso pain.  States she was found to have valve blockage that was corrected.  Getting further testing tomorrow.    In the interim, was briefly on hospice and they put her on soma.  Pt continues med.   I advised she must discontinue in order to get the other medications from me.  Pt will stop and return to tizanidine    OARRS:  Adilia Rincon MD on 2/19/2024 11:13 AM  I have personally reviewed the OARRS report for Pushpa Garrett. I have considered the risks of abuse, dependence, addiction and diversion and I believe that it is clinically appropriate for Pushpa Garrett to be prescribed this medication    Is the patient prescribed a combination of a benzodiazepine and opioid? yes    Last Urine Drug Screen / ordered today: Yes  Recent Results (from the past 8760 hour(s))   OPIATE/OPIOID/BENZO PRESCRIPTION COMPLIANCE    Collection Time: 08/21/23  9:56 AM   Result Value Ref Range    DRUG SCREEN COMMENT URINE SEE BELOW     Creatine, Urine 11.8 (A) mg/dL    Specific Gravity, Urine Drug 1.0040 Valid 1.0020-1.0200    Amphetamine Screen, Urine PRESUMPTIVE NEGATIVE NEGATIVE    Barbiturate Screen, Urine PRESUMPTIVE NEGATIVE NEGATIVE    Cannabinoid Screen, Urine PRESUMPTIVE NEGATIVE NEGATIVE    Cocaine Screen, Urine PRESUMPTIVE NEGATIVE NEGATIVE    PCP Screen, Urine PRESUMPTIVE NEGATIVE NEGATIVE     7-Aminoclonazepam <25 Cutoff <25 ng/mL    Alpha-Hydroxyalprazolam <25 Cutoff <25 ng/mL    Alpha-Hydroxymidazolam <25 Cutoff <25 ng/mL    Alprazolam <25 Cutoff <25 ng/mL    Chlordiazepoxide <25 Cutoff <25 ng/mL    Clonazepam <25 Cutoff <25 ng/mL    Diazepam <25 Cutoff <25 ng/mL    Lorazepam <25 Cutoff <25 ng/mL    Midazolam <25 Cutoff <25 ng/mL    Nordiazepam <25 Cutoff <25 ng/mL    Oxazepam 70 (A) Cutoff <25 ng/mL    Temazepam 34 (A) Cutoff <25 ng/mL    Zolpidem <25 Cutoff <25 ng/mL    Zolpidem Metabolite (ZCA) 334 (A) Cutoff <25 ng/mL    6-Acetylmorphine <25 Cutoff <25 ng/mL    Codeine <50 Cutoff <50 ng/mL    Hydrocodone <25 Cutoff <25 ng/mL    Hydromorphone <25 Cutoff <25 ng/mL    Morphine Urine <50 Cutoff <50 ng/mL    Norhydrocodone <25 Cutoff <25 ng/mL    Noroxycodone 657 (A) Cutoff <25 ng/mL    Oxycodone 748 (A) Cutoff <25 ng/mL    Oxymorphone 508 (A) Cutoff <25 ng/mL    Tramadol <50 Cutoff <50 ng/mL    O-Desmethyltramadol <50 Cutoff <50 ng/mL    Fentanyl <2.5 Cutoff<2.5 ng/mL    Norfentanyl <2.5 Cutoff<2.5 ng/mL    METHADONE CONFIRMATION,URINE <25 Cutoff <25 ng/mL    EDDP <25 Cutoff <25 ng/mL     Results are as expected.         Controlled Substance Agreement:  Date of the Last Agreement: 11/23  Reviewed Controlled Substance Agreement including but not limited to the benefits, risks, and alternatives to treatment with a Controlled Substance medication(s).    Opioids:  What is the patient's goal of therapy? Pain relief  Is this being achieved with current treatment? partially    I have calculated the patient's Morphine Dose Equivalent (MED):   I have considered referral to Pain Management and/or a specialist, and do not feel it is necessary at this time.  Pt did see pain management who offered monthly infusions which she declined    I feel that it is clinically indicated to continue this current medication regimen after consideration of alternative therapies, and other non-opioid treatment.    Opioid Risk  Screening:  Family History of Substance Abuse  Alcohol: 0 (2024 10:00 AM)  Illegal Dru (2024 10:00 AM)  Prescription Dru (2024 10:00 AM)    Personal History of Substance Abuse  Alcohol: 0 (2024 10:00 AM)  Illegal Drugs: 0 (2024 10:00 AM)  Prescription Drugs: 0 (2024 10:00 AM)    Patient Age (16-45)  Age (16-45): 0 (2024 10:00 AM)    History of Preadolescent Sexual Abuse  History of Preadolescent Sexual Abuse: 0 (2024 10:00 AM)    Psychological Disease  Attention Deficit Disorder, Obsessive Compulsive Disorder, Bipolar, Schizophrenia: 0 (2024 10:00 AM)  Depression: 0 (2024 10:00 AM)    Total Score  Total Score: 0 (2024 10:00 AM)        Pain Assessment:  Pain level 8   Meds do provide relief and improve functionality  Patient Active Problem List    Diagnosis Date Noted    Type 2 diabetes mellitus without complication, without long-term current use of insulin (CMS/[facility]) 2023    Chronic obstructive pulmonary disease, unspecified COPD type (CMS/[facility]) 2023    Immunosuppression due to chronic steroid use (CMS/[facility]) 2023    Calcification of aorta (CMS/[facility]) 2023    Medication management 2023    Aldana's esophagus 10/09/2023    Fibromyalgia 10/09/2023    Generalized osteoarthritis 10/09/2023    Hypertension 10/09/2023    Localized osteoarthritis of both hands 10/09/2023    Long-term current use of apremilast 10/09/2023    Lumbar radiculopathy 10/09/2023    Mixed hyperlipidemia 10/09/2023    Osteomyelitis of forefoot (CMS/[facility]) 10/09/2023    Plaque psoriasis 10/09/2023    Primary osteoarthritis of both knees 10/09/2023    Reactive airways dysfunction syndrome (CMS/[facility]) 10/09/2023    Situational anxiety 10/09/2023     Past Medical History:   Diagnosis Date    Bile duct stenosis     Biliary duct stenosis    Helicobacter pylori (H. pylori) infection     History of Helicobacter infection    Herpes zoster 2019    History of herpes zoster     "Leukocytosis 07/29/2019    History of leukocytosis    Other osteomyelitis, ankle and foot (CMS/HCC) 07/25/2022    Osteomyelitis of forefoot    Pleurisy 04/18/2018    History of pleurisy    Sciatica, right side 12/13/2017    Sciatica of right side     Current Outpatient Medications   Medication Instructions    albuterol (ProAir HFA) 90 mcg/actuation inhaler inhalation    Breo Ellipta 200-25 mcg/dose inhaler 1 puff, inhalation, Daily    celecoxib (CELEBREX) 200 mg, oral, Daily    cholecalciferol (Vitamin D-3) 25 MCG (1000 UT) tablet 1 tablet, oral, Daily    cyclobenzaprine (FLEXERIL) 10 mg, oral, 3 times daily    diazePAM (Valium) 5 mg tablet TAKE 1 TABLET BY MOUTH EVERY 24 HOURS AS NEEDED    esomeprazole (NEXIUM) 40 mg, oral, Daily    furosemide (Lasix) 40 mg tablet 1 tablet, oral, Daily PRN    ipratropium-albuteroL (Duo-Neb) 0.5-2.5 mg/3 mL nebulizer solution USE 3 MLS VIA NEBULIZER EVERY 4 HOURS AS NEEDED    metoprolol succinate XL (TOPROL-XL) 50 mg, oral, Daily    montelukast (SINGULAIR) 10 mg, oral, Nightly    naloxone (Narcan) 4 mg/0.1 mL nasal spray nasal    nystatin (Mycostatin) 100,000 unit/mL suspension oral, 2 times daily    oxyCODONE-acetaminophen (Percocet)  mg tablet 1-2 tablets, oral, Every 6 hours PRN    predniSONE (DELTASONE) 10 mg, oral, Daily, as directed    pregabalin (LYRICA) 150 mg, oral, 2 times daily    promethazine (PHENERGAN) 25 mg, oral, 3 times daily before meals    rosuvastatin (CRESTOR) 10 mg, oral, Daily    zolpidem (AMBIEN) 10 mg, oral, Nightly     Allergies   Allergen Reactions    Adhesive Tape-Silicones Hives    Alendronate Other     Muscle/joint pain     Muscle/joint pain    Citalopram Other     throat closing      Morphine Other    Penicillins Other    Sulfamethoxazole-Trimethoprim Other    Adhesive Rash    Levofloxacin Other, GI Upset and Hives     Other reaction(s): GI Upset   Other reaction(s): GI Upset, HIVES   \"throat closed\"    Other reaction(s): GI Upset, " "HIVES    \"throat closed\"    Sulfa (Sulfonamide Antibiotics) Rash     Review of Systems   Constitutional:  Positive for fatigue. Negative for fever.   Respiratory:  Negative for cough and shortness of breath.    Cardiovascular:  Negative for leg swelling.   Musculoskeletal:  Positive for arthralgias, back pain, myalgias and neck pain. Negative for gait problem and joint swelling.   Skin:  Negative for color change and rash.   Neurological:  Positive for weakness and numbness.       PHYSICAL EXAM  BP (!) 156/102   Pulse 84   Temp 36.6 °C (97.9 °F) (Temporal)   Ht 1.676 m (5' 6\")   Wt 75.8 kg (167 lb)   SpO2 95%   BMI 26.95 kg/m²   Physical Exam  Vitals reviewed.   Constitutional:       General: She is not in acute distress.     Appearance: She is not ill-appearing.   HENT:      Head: Normocephalic and atraumatic.   Eyes:      Conjunctiva/sclera: Conjunctivae normal.   Pulmonary:      Effort: No respiratory distress.   Musculoskeletal:      Cervical back: Normal range of motion.      Comments: No visible abnormalities noted   Skin:     Findings: No rash.   Neurological:      General: No focal deficit present.      Mental Status: She is alert and oriented to person, place, and time. Mental status is at baseline.   Psychiatric:         Mood and Affect: Mood normal.         Assessment/plan  Problem List Items Addressed This Visit       Fibromyalgia - Primary    Current Assessment & Plan     FMS and OA.  Requires opioids and muscle relaxers.   Advised to stop soma and return to tizanidine.  Pt on low dose daily steroids --continue to monitor         Generalized osteoarthritis    Reactive airways dysfunction syndrome (CMS/HCC)    Current Assessment & Plan     Chronic Condition Documentation: Stable based on symptoms and exam. Continue      established treatment plan and follow-up at least yearly.  Monitored by PCP         Medication management    Overview     CSA 11/23  UDS 8/23         Current Assessment & Plan     " Review of CSA done and  to continue to adhere to policy  CSA updated in EMR:             Type 2 diabetes mellitus without complication, without long-term current use of insulin (CMS/[facility])    Current Assessment & Plan     Chronic Condition Documentation: Stable based on symptoms and exam. Continue      established treatment plan and follow-up at least yearly.  Monitored by PCP         Immunosuppression due to chronic steroid use (CMS/[facility])    Current Assessment & Plan     Needs low dose steroids with prn tapers to control daily symptoms          Follow up: __3___months              Patient was identified as a fall risk. Risk prevention instructions provided.

## 2024-02-19 NOTE — PATIENT INSTRUCTIONS
It was a pleasure to see you today  Please call if your symptoms worsen  Please review your summary for education and reminders.  Follow up at your next appointment.    If you had labs/xrays done today, you will be able to view on [employer].   We will contact you when the results are reviewed for further discussion.  Please note that you may receive your results before I have had a chance to review.  Please know I will be contacting you for discussion  Homegoing instructions for all patient  A healthy lifestyle helps chronic diseases  These are all the goals you should strive to improve your overall health   Blood pressure <130/85   BMI of <30 or waist circumference that is 1/2 of your height   Fasting blood sugar <107 (if you are diabetic, aim for an A1c <6.4%_   LDL cholesterol <130   Avoid smoking   Manage your stress   Get your preventive exams   Get your immunizations        Ways to Help Prevent Falls at Home    Quick Tips   ? Ask for help if you need it. Most people want to help!   ? Get up slowly after sitting or laying down   ? Wear a medical alert device or keep cell phone in your pocket   ? Use night lights, especially areas near a bathroom   ? Keep the items you use often within reach on a small stool or end table   ? Use an assistive device such as walker or cane, as directed by provider/physical therapy   ? Use a non-slip mat and grab bars in your bathroom. Look for home health sections for best options     Other Areas to Focus On   ? Exercise and nutrition: Regular exercise or taking a falls prevention class are great ways improve strength and balance. Don’t forget to stay hydrated and bring a snack!   ? Medicine side effects: Some medicines can make you sleepy or dizzy, which could cause a fall. Ask your healthcare provider about the side effects your medicines could cause. Be sure to let them know if you take any vitamins or supplements as well.   ? Tripping hazards: Remove items you could trip on,  such as loose mats, rugs, cords, and clutter. Wear closed toe shoes with rubber soles.   ? Health and wellness: Get regular checkups with your healthcare provider, plus routine vision and hearing screenings. Talk with your healthcare provider about:   o Your medicines and the possible side effects - bring them in a bag if that is easier!   o Problems with balance or feeling dizzy   o Ways to promote bone health, such as Vitamin D and calcium supplements   o Questions or concerns about falling     *Ask your healthcare team if you have questions     ©[facility], 2022

## 2024-03-07 ENCOUNTER — TELEPHONE (OUTPATIENT)
Dept: RHEUMATOLOGY | Facility: CLINIC | Age: 64
End: 2024-03-07
Payer: COMMERCIAL

## 2024-03-07 NOTE — TELEPHONE ENCOUNTER
Patient said at her last visit you told her to stop taking soma.  She is currently on Palative care.  Dr. Saavedra thinks she should be on it.  I told her I would let you know and that she may have to have that doctor take over her controlled substances if you did not agree to her taking this with her other medications.

## 2024-03-08 NOTE — TELEPHONE ENCOUNTER
Patient called and I read to patient Dr. Rincon message.  Patient states she will think it over and will call back next week.

## 2024-03-11 ENCOUNTER — TELEPHONE (OUTPATIENT)
Dept: RHEUMATOLOGY | Facility: CLINIC | Age: 64
End: 2024-03-11
Payer: COMMERCIAL

## 2024-03-11 DIAGNOSIS — M79.7 FIBROMYALGIA: ICD-10-CM

## 2024-03-11 RX ORDER — PREGABALIN 150 MG/1
150 CAPSULE ORAL 2 TIMES DAILY
Qty: 60 CAPSULE | Refills: 0 | Status: SHIPPED | OUTPATIENT
Start: 2024-03-14 | End: 2024-04-10 | Stop reason: SDUPTHER

## 2024-03-11 RX ORDER — OXYCODONE AND ACETAMINOPHEN 10; 325 MG/1; MG/1
1-2 TABLET ORAL EVERY 6 HOURS PRN
Qty: 150 TABLET | Refills: 0 | Status: SHIPPED | OUTPATIENT
Start: 2024-03-13 | End: 2024-05-24 | Stop reason: WASHOUT

## 2024-03-11 NOTE — TELEPHONE ENCOUNTER
Patient has decided that she will not go on soma.  She wants you to continue giving her the pain medication and continue the controlled substance agreement with you.    She needs a refill this weekend for Lyrica 150  and Oxycodone.     [facility] [phone]

## 2024-03-22 ENCOUNTER — TELEPHONE (OUTPATIENT)
Dept: PRIMARY CARE | Facility: CLINIC | Age: 64
End: 2024-03-22
Payer: COMMERCIAL

## 2024-03-22 DIAGNOSIS — M79.7 FIBROMYALGIA: ICD-10-CM

## 2024-03-22 RX ORDER — TIZANIDINE 4 MG/1
4 TABLET ORAL EVERY 8 HOURS PRN
Qty: 90 TABLET | Refills: 11 | Status: SHIPPED | OUTPATIENT
Start: 2024-03-22 | End: 2024-05-24 | Stop reason: WASHOUT

## 2024-03-22 NOTE — TELEPHONE ENCOUNTER
Pt called for a refill on tizanidine 4mg        [id number] - [place], [place] - [address] AT [facility] [address]  [address]   [facility] [address]  Phone: [phone] Fax: [phone]

## 2024-04-10 ENCOUNTER — TELEPHONE (OUTPATIENT)
Dept: RHEUMATOLOGY | Facility: CLINIC | Age: 64
End: 2024-04-10
Payer: COMMERCIAL

## 2024-04-10 DIAGNOSIS — M79.7 FIBROMYALGIA: ICD-10-CM

## 2024-04-10 RX ORDER — CELECOXIB 200 MG/1
200 CAPSULE ORAL DAILY
Qty: 90 CAPSULE | Refills: 3 | Status: SHIPPED | OUTPATIENT
Start: 2024-04-10 | End: 2025-04-10

## 2024-04-10 RX ORDER — PREGABALIN 150 MG/1
150 CAPSULE ORAL 2 TIMES DAILY
Qty: 60 CAPSULE | Refills: 0 | Status: SHIPPED | OUTPATIENT
Start: 2024-04-13 | End: 2024-05-14 | Stop reason: SDUPTHER

## 2024-05-10 ENCOUNTER — TELEPHONE (OUTPATIENT)
Dept: PRIMARY CARE | Facility: CLINIC | Age: 64
End: 2024-05-10
Payer: COMMERCIAL

## 2024-05-10 DIAGNOSIS — M79.7 FIBROMYALGIA: Primary | ICD-10-CM

## 2024-05-10 RX ORDER — PREDNISONE 10 MG/1
TABLET ORAL DAILY
Qty: 30 TABLET | Refills: 0 | Status: SHIPPED | OUTPATIENT
Start: 2024-05-10 | End: 2024-05-24 | Stop reason: ALTCHOICE

## 2024-05-10 NOTE — TELEPHONE ENCOUNTER
Pt states she is having a flare up and would like you to send in a RX of Prednisone. Pharm in chart.

## 2024-05-14 ENCOUNTER — TELEPHONE (OUTPATIENT)
Dept: RHEUMATOLOGY | Facility: CLINIC | Age: 64
End: 2024-05-14
Payer: COMMERCIAL

## 2024-05-14 RX ORDER — PREGABALIN 150 MG/1
150 CAPSULE ORAL 2 TIMES DAILY
Qty: 60 CAPSULE | Refills: 0 | Status: SHIPPED | OUTPATIENT
Start: 2024-05-14 | End: 2025-05-14

## 2024-05-14 NOTE — TELEPHONE ENCOUNTER
Patient called [phone]  Patient phone [phone].

## 2024-05-24 ENCOUNTER — TELEMEDICINE (OUTPATIENT)
Dept: RHEUMATOLOGY | Facility: CLINIC | Age: 64
End: 2024-05-24
Payer: COMMERCIAL

## 2024-05-24 DIAGNOSIS — M15.9 GENERALIZED OSTEOARTHRITIS: ICD-10-CM

## 2024-05-24 DIAGNOSIS — D84.821 IMMUNOSUPPRESSION DUE TO CHRONIC STEROID USE (MULTI): ICD-10-CM

## 2024-05-24 DIAGNOSIS — Z79.899 MEDICATION MANAGEMENT: ICD-10-CM

## 2024-05-24 DIAGNOSIS — T38.0X5A IMMUNOSUPPRESSION DUE TO CHRONIC STEROID USE (MULTI): ICD-10-CM

## 2024-05-24 DIAGNOSIS — Z79.52 IMMUNOSUPPRESSION DUE TO CHRONIC STEROID USE (MULTI): ICD-10-CM

## 2024-05-24 DIAGNOSIS — M79.7 FIBROMYALGIA: Primary | ICD-10-CM

## 2024-05-24 PROCEDURE — 4010F ACE/ARB THERAPY RXD/TAKEN: CPT | Performed by: INTERNAL MEDICINE

## 2024-05-24 PROCEDURE — 1123F ACP DISCUSS/DSCN MKR DOCD: CPT | Performed by: INTERNAL MEDICINE

## 2024-05-24 PROCEDURE — 1159F MED LIST DOCD IN RCRD: CPT | Performed by: INTERNAL MEDICINE

## 2024-05-24 PROCEDURE — 1158F ADVNC CARE PLAN TLK DOCD: CPT | Performed by: INTERNAL MEDICINE

## 2024-05-24 PROCEDURE — 1160F RVW MEDS BY RX/DR IN RCRD: CPT | Performed by: INTERNAL MEDICINE

## 2024-05-24 PROCEDURE — 99213 OFFICE O/P EST LOW 20 MIN: CPT | Performed by: INTERNAL MEDICINE

## 2024-05-24 RX ORDER — LORAZEPAM 0.5 MG/1
0.5 TABLET ORAL EVERY 6 HOURS PRN
COMMUNITY

## 2024-05-24 RX ORDER — CARISOPRODOL 350 MG/1
350 TABLET ORAL 2 TIMES DAILY
COMMUNITY
Start: 2024-04-30 | End: 2024-05-30

## 2024-05-24 RX ORDER — LISINOPRIL 10 MG/1
10 TABLET ORAL DAILY
COMMUNITY

## 2024-05-24 RX ORDER — FENTANYL 12.5 UG/1
1 PATCH TRANSDERMAL
COMMUNITY
End: 2024-05-24 | Stop reason: ALTCHOICE

## 2024-05-24 RX ORDER — OXYCODONE HYDROCHLORIDE 10 MG/1
10 TABLET ORAL EVERY 6 HOURS PRN
COMMUNITY
Start: 2024-03-31

## 2024-05-24 RX ORDER — FENTANYL 25 UG/1
1 PATCH TRANSDERMAL
COMMUNITY

## 2024-05-24 ASSESSMENT — ENCOUNTER SYMPTOMS
BACK PAIN: 0
MYALGIAS: 1
NUMBNESS: 0
COLOR CHANGE: 0
FEVER: 0
FATIGUE: 1
WEAKNESS: 0
COUGH: 1
SHORTNESS OF BREATH: 1
JOINT SWELLING: 1
ARTHRALGIAS: 1

## 2024-05-24 ASSESSMENT — PATIENT HEALTH QUESTIONNAIRE - PHQ9
2. FEELING DOWN, DEPRESSED OR HOPELESS: NOT AT ALL
SUM OF ALL RESPONSES TO PHQ9 QUESTIONS 1 AND 2: 0
1. LITTLE INTEREST OR PLEASURE IN DOING THINGS: NOT AT ALL

## 2024-05-24 NOTE — PATIENT INSTRUCTIONS
It was a pleasure to see you today  Please call if your symptoms worsen  Please review your summary for education and reminders.  Follow up at your next appointment.    If you had labs/xrays done today, you will be able to view on [employer].   We will contact you when the results are reviewed for further discussion.  Please note that you may receive your results before I have had a chance to review.  Please know I will be contacting you for discussion  Homegoing instructions for all patient  A healthy lifestyle helps chronic diseases  These are all the goals you should strive to improve your overall health   Blood pressure <130/85   BMI of <30 or waist circumference that is 1/2 of your height   Fasting blood sugar <107 (if you are diabetic, aim for an A1c <6.4%_   LDL cholesterol <130   Avoid smoking   Manage your stress   Get your preventive exams   Get your immunizations

## 2024-05-24 NOTE — ASSESSMENT & PLAN NOTE
Pain meds now managed by palliative care.   I am only prescribing lyrica.   Will update CSA to reflect that at patient's next in person visit

## 2024-05-24 NOTE — PROGRESS NOTES
Virtual or Telephone Consent    An interactive audio and video telecommunication system which permits real time communications between the patient (at the originating site) and provider (at the distant site) was utilized to provide this telehealth service.   Verbal consent was requested and obtained from Pushpa Garrett on this date, 05/24/24 for a telehealth visit.   RHEUMATOLOGY PROGRESS NOTE  Pushpa Garrett 63 y.o. female  Chief Complaint   Patient presents with    Osteoarthritis    Fibromyalgia       SUBJECTIVE  Since last seen, pt has started palliative care and getting pain meds and SOMA through them.   (I continue to give the lyrica).  Pt feels pain is getting under better control.    Has been on steroid tapers which help.  Pt has had recurrent bronchitis and currently on antibiotics.  Notes more hand pain and swelling      OARRS:  Reviewed today  I have personally reviewed the OARRS report for Pushpa Garrett. I have considered the risks of abuse, dependence, addiction and diversion and I believe that it is clinically appropriate for Pushpa Garrett to be prescribed this medication    Is the patient prescribed a combination of a benzodiazepine and opioid?  No    Last Urine Drug Screen / ordered today: Yes  Recent Results (from the past 8760 hour(s))   OPIATE/OPIOID/BENZO PRESCRIPTION COMPLIANCE    Collection Time: 08/21/23  9:56 AM   Result Value Ref Range    DRUG SCREEN COMMENT URINE SEE BELOW     Creatine, Urine 11.8 (A) mg/dL    Specific Gravity, Urine Drug 1.0040 Valid 1.0020-1.0200    Amphetamine Screen, Urine PRESUMPTIVE NEGATIVE NEGATIVE    Barbiturate Screen, Urine PRESUMPTIVE NEGATIVE NEGATIVE    Cannabinoid Screen, Urine PRESUMPTIVE NEGATIVE NEGATIVE    Cocaine Screen, Urine PRESUMPTIVE NEGATIVE NEGATIVE    PCP Screen, Urine PRESUMPTIVE NEGATIVE NEGATIVE    7-Aminoclonazepam <25 Cutoff <25 ng/mL    Alpha-Hydroxyalprazolam <25 Cutoff <25 ng/mL    Alpha-Hydroxymidazolam <25 Cutoff <25 ng/mL     Alprazolam <25 Cutoff <25 ng/mL    Chlordiazepoxide <25 Cutoff <25 ng/mL    Clonazepam <25 Cutoff <25 ng/mL    Diazepam <25 Cutoff <25 ng/mL    Lorazepam <25 Cutoff <25 ng/mL    Midazolam <25 Cutoff <25 ng/mL    Nordiazepam <25 Cutoff <25 ng/mL    Oxazepam 70 (A) Cutoff <25 ng/mL    Temazepam 34 (A) Cutoff <25 ng/mL    Zolpidem <25 Cutoff <25 ng/mL    Zolpidem Metabolite (ZCA) 334 (A) Cutoff <25 ng/mL    6-Acetylmorphine <25 Cutoff <25 ng/mL    Codeine <50 Cutoff <50 ng/mL    Hydrocodone <25 Cutoff <25 ng/mL    Hydromorphone <25 Cutoff <25 ng/mL    Morphine Urine <50 Cutoff <50 ng/mL    Norhydrocodone <25 Cutoff <25 ng/mL    Noroxycodone 657 (A) Cutoff <25 ng/mL    Oxycodone 748 (A) Cutoff <25 ng/mL    Oxymorphone 508 (A) Cutoff <25 ng/mL    Tramadol <50 Cutoff <50 ng/mL    O-Desmethyltramadol <50 Cutoff <50 ng/mL    Fentanyl <2.5 Cutoff<2.5 ng/mL    Norfentanyl <2.5 Cutoff<2.5 ng/mL    METHADONE CONFIRMATION,URINE <25 Cutoff <25 ng/mL    EDDP <25 Cutoff <25 ng/mL     Results are as expected.         Controlled Substance Agreement:  Date of the Last Agreement: 11/23--will need updated since palliative care is giving opioids and I am just giving lyrica  Reviewed Controlled Substance Agreement including but not limited to the benefits, risks, and alternatives to treatment with a Controlled Substance medication(s).    Lyrica:  What is the patient's goal of therapy? Pain relief  Is this being achieved with current treatment? yes    Pain Assessment:  6/10    Activities of Daily Living:  Is your overall impression that this patient is benefiting (symptom reduction outweighs side effects) from Lyrica therapy? Yes     1. Physical Functioning: Same  2. Family Relationship: Same  3. Social Relationship: Same  4. Mood: Same  5. Sleep Patterns: Same  6. Overall Function: Same    Records since last seen reviewed in 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) 11/27/2023    Calcification of aorta (CMS-HCC) 11/24/2023    Medication management 11/24/2023    Aldana's esophagus 10/09/2023    Fibromyalgia 10/09/2023    Generalized osteoarthritis 10/09/2023    Hypertension 10/09/2023    Localized osteoarthritis of both hands 10/09/2023    Long-term current use of apremilast 10/09/2023    Lumbar radiculopathy 10/09/2023    Mixed hyperlipidemia 10/09/2023    Osteomyelitis of forefoot (Multi) 10/09/2023    Plaque psoriasis 10/09/2023    Primary osteoarthritis of both knees 10/09/2023    Reactive airways dysfunction syndrome (Multi) 10/09/2023    Situational anxiety 10/09/2023     Past Medical History:   Diagnosis Date    Bile duct stenosis (CMS[facility])     Biliary duct stenosis    Helicobacter pylori (H. pylori) infection     History of Helicobacter infection    Herpes zoster 01/28/2019    History of herpes zoster    Leukocytosis 07/29/2019    History of leukocytosis    Other osteomyelitis, ankle and foot (Multi) 07/25/2022    Osteomyelitis of forefoot    Pleurisy 04/18/2018    History of pleurisy    Sciatica, right side 12/13/2017    Sciatica of right side     Current Outpatient Medications   Medication Instructions    albuterol (ProAir HFA) 90 mcg/actuation inhaler inhalation    Breo Ellipta 200-25 mcg/dose inhaler 1 puff, inhalation, Daily    carisoprodol (SOMA) 350 mg, oral, 2 times daily    celecoxib (CELEBREX) 200 mg, oral, Daily    cholecalciferol (Vitamin D-3) 25 MCG (1000 UT) tablet 1 tablet, oral, Daily    diazePAM (Valium) 5 mg tablet TAKE 1 TABLET BY MOUTH EVERY 24 HOURS AS NEEDED    esomeprazole (NEXIUM) 40 mg, oral, Daily    fentaNYL (Duragesic) 25 mcg/hr patch 1 patch, transdermal, Every 72 hours    furosemide (Lasix) 40 mg tablet 1  "tablet, oral, Daily PRN    ipratropium-albuteroL (Duo-Neb) 0.5-2.5 mg/3 mL nebulizer solution USE 3 MLS VIA NEBULIZER EVERY 4 HOURS AS NEEDED    lisinopril 10 mg, oral, Daily    LORazepam (ATIVAN) 0.5 mg, oral, Every 6 hours PRN    metoprolol succinate XL (TOPROL-XL) 100 mg, oral, Daily    montelukast (SINGULAIR) 10 mg, oral, Nightly    naloxone (Narcan) 4 mg/0.1 mL nasal spray nasal    oxyCODONE (ROXICODONE) 10 mg, oral, Every 6 hours PRN    predniSONE (DELTASONE) 10 mg, oral, Daily, as directed    pregabalin (LYRICA) 150 mg, oral, 2 times daily    promethazine (PHENERGAN) 25 mg, oral, 3 times daily before meals    zolpidem (AMBIEN) 10 mg, oral, Nightly     Allergies   Allergen Reactions    Adhesive Tape-Silicones Hives    Alendronate Other     Muscle/joint pain     Muscle/joint pain    Citalopram Other     throat closing      Morphine Other    Penicillins Other    Sulfamethoxazole-Trimethoprim Other    Adhesive Rash    Levofloxacin Other, GI Upset and Hives     Other reaction(s): GI Upset   Other reaction(s): GI Upset, HIVES   \"throat closed\"    Other reaction(s): GI Upset, HIVES    \"throat closed\"    Sulfa (Sulfonamide Antibiotics) Rash     Review of Systems   Constitutional:  Positive for fatigue. Negative for fever.   Respiratory:  Positive for cough and shortness of breath.    Cardiovascular:  Negative for leg swelling.   Musculoskeletal:  Positive for arthralgias, joint swelling and myalgias. Negative for back pain and gait problem.   Skin:  Negative for color change and rash.   Neurological:  Negative for weakness and numbness.       PHYSICAL EXAM  There were no vitals taken for this visit.  Physical Exam  Vitals reviewed.   Constitutional:       General: She is not in acute distress.     Appearance: She is not ill-appearing.   HENT:      Head: Normocephalic and atraumatic.   Eyes:      Conjunctiva/sclera: Conjunctivae normal.   Pulmonary:      Effort: No respiratory distress.   Musculoskeletal:         " General: Swelling present.      Cervical back: Normal range of motion.      Comments: No visible abnormalities noted other than swelling of index finger and MCP joint   Skin:     Findings: No rash.   Neurological:      General: No focal deficit present.      Mental Status: She is alert and oriented to person, place, and time. Mental status is at baseline.   Psychiatric:         Mood and Affect: Mood normal.         Assessment/plan  Problem List Items Addressed This Visit       Fibromyalgia - Primary    Current Assessment & Plan     Will continue on lyrica, celebrex         Generalized osteoarthritis    Current Assessment & Plan     Symptoms in improved control and palliative care to manage         Medication management    Overview     CSA 11/23  UDS 8/23  (no further needed--on palliative care)         Current Assessment & Plan     Pain meds now managed by palliative care.   I am only prescribing lyrica.   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

## 2024-06-13 ENCOUNTER — TELEPHONE (OUTPATIENT)
Dept: RHEUMATOLOGY | Facility: CLINIC | Age: 64
End: 2024-06-13
Payer: COMMERCIAL

## 2024-06-13 DIAGNOSIS — M79.7 FIBROMYALGIA: ICD-10-CM

## 2024-06-13 RX ORDER — PREGABALIN 150 MG/1
150 CAPSULE ORAL 2 TIMES DAILY
Qty: 60 CAPSULE | Refills: 0 | Status: SHIPPED | OUTPATIENT
Start: 2024-06-13 | End: 2025-06-13

## 2024-06-13 NOTE — TELEPHONE ENCOUNTER
Patient called and left a message on our voicemail requesting   Rx refill-Lyrica 150 mg bid    Pharmacy:   [id number] - [place], [place] - [address] AT [facility] [address]  [address] [place] [address]  Phone: [phone] Fax: [phone]      Patient phone: [phone]

## 2024-07-15 ENCOUNTER — TELEPHONE (OUTPATIENT)
Dept: PRIMARY CARE | Facility: CLINIC | Age: 64
End: 2024-07-15
Payer: COMMERCIAL

## 2024-07-15 DIAGNOSIS — M79.7 FIBROMYALGIA: ICD-10-CM

## 2024-07-15 RX ORDER — PREGABALIN 150 MG/1
150 CAPSULE ORAL 2 TIMES DAILY
Qty: 60 CAPSULE | Refills: 1 | Status: SHIPPED | OUTPATIENT
Start: 2024-07-15 | End: 2025-07-15

## 2024-07-15 NOTE — TELEPHONE ENCOUNTER
Pt called for a refill on   pregabalin (Lyrica) 150 mg capsule         [id number] - [place], [place] - [address] AT [facility] [address]  [address]  [facility] [address]  Phone: [phone] Fax: [phone]

## 2024-08-19 ENCOUNTER — APPOINTMENT (OUTPATIENT)
Dept: RHEUMATOLOGY | Facility: CLINIC | Age: 64
End: 2024-08-19
Payer: COMMERCIAL